# Patient Record
Sex: MALE | Race: WHITE | HISPANIC OR LATINO | URBAN - METROPOLITAN AREA
[De-identification: names, ages, dates, MRNs, and addresses within clinical notes are randomized per-mention and may not be internally consistent; named-entity substitution may affect disease eponyms.]

---

## 2017-08-18 ENCOUNTER — OFFICE VISIT (OUTPATIENT)
Dept: URGENT CARE | Facility: CLINIC | Age: 22
End: 2017-08-18
Payer: COMMERCIAL

## 2017-08-18 VITALS
HEIGHT: 66 IN | WEIGHT: 145 LBS | SYSTOLIC BLOOD PRESSURE: 116 MMHG | DIASTOLIC BLOOD PRESSURE: 76 MMHG | BODY MASS INDEX: 23.3 KG/M2 | TEMPERATURE: 99 F | OXYGEN SATURATION: 97 % | HEART RATE: 70 BPM
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
## 2017-08-18 DIAGNOSIS — S16.1XXA CERVICAL STRAIN, INITIAL ENCOUNTER: ICD-10-CM

## 2017-08-18 PROCEDURE — 99203 OFFICE O/P NEW LOW 30 MIN: CPT | Performed by: PHYSICIAN ASSISTANT

## 2017-08-18 RX ORDER — IBUPROFEN 200 MG
200 TABLET ORAL EVERY 6 HOURS PRN
COMMUNITY

## 2017-08-18 ASSESSMENT — ENCOUNTER SYMPTOMS
HEADACHES: 0
NAUSEA: 0
SYNCOPE: 0
SHORTNESS OF BREATH: 0
COUGH: 0
PHOTOPHOBIA: 0
VISUAL CHANGE: 0
WEAKNESS: 0
ABDOMINAL PAIN: 0
LEG PAIN: 0
PALPITATIONS: 0
TINGLING: 0
NUMBNESS: 0
SORE THROAT: 0
VOMITING: 0
PARESIS: 0
DOUBLE VISION: 0
DIARRHEA: 0
MYALGIAS: 1
FOCAL WEAKNESS: 0
WEIGHT LOSS: 0
BLURRED VISION: 0
SENSORY CHANGE: 0
FEVER: 0
NECK PAIN: 1
TROUBLE SWALLOWING: 0
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
## 2017-11-04 ENCOUNTER — HOSPITAL ENCOUNTER (OUTPATIENT)
Facility: MEDICAL CENTER | Age: 22
End: 2017-11-04
Attending: PHYSICIAN ASSISTANT
Payer: COMMERCIAL

## 2017-11-04 ENCOUNTER — OFFICE VISIT (OUTPATIENT)
Dept: URGENT CARE | Facility: PHYSICIAN GROUP | Age: 22
End: 2017-11-04
Payer: COMMERCIAL

## 2017-11-04 VITALS
HEART RATE: 88 BPM | RESPIRATION RATE: 14 BRPM | WEIGHT: 145 LBS | HEIGHT: 66 IN | BODY MASS INDEX: 23.3 KG/M2 | OXYGEN SATURATION: 98 % | SYSTOLIC BLOOD PRESSURE: 122 MMHG | TEMPERATURE: 99.1 F | DIASTOLIC BLOOD PRESSURE: 88 MMHG

## 2017-11-04 DIAGNOSIS — R10.11 RUQ ABDOMINAL PAIN: ICD-10-CM

## 2017-11-04 PROCEDURE — 99213 OFFICE O/P EST LOW 20 MIN: CPT | Performed by: PHYSICIAN ASSISTANT

## 2017-11-04 NOTE — PROGRESS NOTES
Chief Complaint   Patient presents with   • Abdominal Pain     RUQ pain; has had two episodes after heavy meals in the past month       HISTORY OF PRESENT ILLNESS: Patient is a 22 y.o. male who presents today for 2 episodes in the last month of RUQ pain.   He states the first episode was after having fried chicken and about 1 hour later he had RUQ pain for 4 hours and vomiting.   This resolved on its own.   2 days ago he had [employer] and similar episode and felt nausea but no vomiting.  Mom, dad and sisters had their gallbladders removed.  He states he has specific concern for this.  No other history of this and there have been many times that he has eaten fattier foods and not had the symptoms.    No changes in bowels.  No hx of GERD or acid reflux  symptoms. No changes in bowels.  No changes in weight.  No fevers or chills.  No painful or difficulty swallowing.     There are no active problems to display for this patient.      Allergies:Penicillins    Current Outpatient Prescriptions Ordered in [facility]   Medication Sig Dispense Refill   • ibuprofen (MOTRIN) 200 MG Tab Take 200 mg by mouth every 6 hours as needed.       No current Epic-ordered facility-administered medications on file.        History reviewed. No pertinent past medical history.    Social History   Substance Use Topics   • Smoking status: Never Smoker   • Smokeless tobacco: Former User     Types: Chew     Quit date: 12/1/2013   • Alcohol use Not on file       No family status information on file.   History reviewed. No pertinent family history.    ROS:  Review of Systems   Constitutional: Negative for fever, chills, weight loss and malaise/fatigue.   HENT: Negative for ear pain, nosebleeds, congestion, sore throat and neck pain.    Eyes: Negative for blurred vision.   Respiratory: Negative for cough, sputum production, shortness of breath and wheezing.    Cardiovascular: Negative for chest pain, palpitations, orthopnea and leg swelling.  "  Gastrointestinal: SEE HPI  All other systems reviewed and are negative.       Exam:  Blood pressure 122/88, pulse 88, temperature 37.3 °C (99.1 °F), resp. rate 14, height 1.676 m (5' 6\"), weight 65.8 kg (145 lb), SpO2 98 %.  General:  Well nourished, well developed male in NAD  Eyes: PERRLA, EOM within normal limits, no conjunctival injection, no scleral icterus, visual fields and acuity grossly intact.  Mouth: reasonable hygiene, no erythema exudates or tonsillar enlargement.  Neck: no masses, range of motion within normal limits, no tracheal deviation. No lymphadenopathy  Pulmonary: Normal respiratory effort, no wheezes, crackles, or rhonchi.  Cardiovascular: regular rate and rhythm without murmurs, rubs, or gallops.  Abdomen: Soft, nontender, nondistended. Normal bowel sounds. No hepatosplenomegaly or masses, or hernias. No rebound or guarding.  Skin: No visible rashes or lesion. Warm, pink, dry.   Extremities: no clubbing, cyanosis, or edema.  Neuro: A&O x 3. Speech normal/clear.  Normal gait.     Impression       1.  1 small 2 mm polyp in the gallbladder neck. Otherwise normal appearance of the gallbladder.    2.  No biliary dilatation. Normal appearance of the liver.    3.  Pancreas not visualized.    4.  Otherwise negative right upper quadrant ultrasound.   Reading Provider Reading Date   Zeus Chaudhary M.D. Nov 8, 2017         Assessment/Plan:  1. RUQ abdominal pain  US-GALLBLADDER    CBC WITH DIFFERENTIAL    COMP METABOLIC PANEL    LIPASE    GAMMA GT (GGT)       -u/s results reviewed with patient as grossly unremarkable.   -issue with labs as the blood was drawn and ultimately due to communication between [facility] lab and [facility] lab blood was not ran or accounted for.  Please note this was addressed and escalated to be made known to area practice manager.  Speaking with patient on 11/16/17 after he returned from [facility] to [place] he reports no further episodes.  We offered to repeat lab draw at this time but " he states given his lack of current symptoms or recurrence he will hold off and follow up with PCP instead, letting us know if he changes his mind on the labs.   -RTC and ER precautions reviewed regarding abdominal pain.       Supportive care, differential diagnoses, and indications for immediate follow-up discussed with patient.   Pathogenesis of diagnosis discussed including typical length and natural progression.   Instructed to return to clinic or nearest emergency department for any change in condition, further concerns, or worsening of symptoms.  Patient states understanding of the plan of care and discharge instructions.  Instructed to make an appointment, for follow up, with their primary care provider.      Sarahi Caldera P.A.-C.

## 2017-11-08 ENCOUNTER — HOSPITAL ENCOUNTER (OUTPATIENT)
Dept: RADIOLOGY | Facility: MEDICAL CENTER | Age: 22
End: 2017-11-08
Attending: PHYSICIAN ASSISTANT
Payer: COMMERCIAL

## 2017-11-08 DIAGNOSIS — R10.11 RUQ ABDOMINAL PAIN: ICD-10-CM

## 2017-11-08 PROCEDURE — 76705 ECHO EXAM OF ABDOMEN: CPT

## 2017-11-16 ENCOUNTER — TELEPHONE (OUTPATIENT)
Dept: URGENT CARE | Facility: PHYSICIAN GROUP | Age: 22
End: 2017-11-16

## 2017-11-16 NOTE — TELEPHONE ENCOUNTER
Called Pt. And gave his message. Pt. Stated that he understood the message. Pt. States that he will most likely follow up with his primary care as he is having no further symptoms. Pt. States that he will have us order the lab only if he is unable to follow up with his primary care soon. Pt. Will call me if he does change his mind about getting the labs.

## 2017-11-16 NOTE — TELEPHONE ENCOUNTER
Mattie, please call patient and let him know that they were unable to locate his labs  This has been discussed with our area practice manager who is aware of the situation.   We can have him re-drawn if he is still having symptoms or if feeling better and based on negative U/S he can hold off and do work up with PCP in [facility] as needed  Let me know his preferred plan.   My intent is that he will not be charged for 2 lab draws please make him aware.     Thank you.

## 2018-02-17 ENCOUNTER — OFFICE VISIT (OUTPATIENT)
Dept: URGENT CARE | Facility: CLINIC | Age: 23
End: 2018-02-17
Payer: COMMERCIAL

## 2018-02-17 VITALS
HEIGHT: 66 IN | SYSTOLIC BLOOD PRESSURE: 118 MMHG | TEMPERATURE: 99.7 F | HEART RATE: 72 BPM | DIASTOLIC BLOOD PRESSURE: 70 MMHG | WEIGHT: 142 LBS | OXYGEN SATURATION: 97 % | BODY MASS INDEX: 22.82 KG/M2

## 2018-02-17 DIAGNOSIS — R68.89 FLU-LIKE SYMPTOMS: ICD-10-CM

## 2018-02-17 LAB
FLUAV+FLUBV AG SPEC QL IA: NEGATIVE
HETEROPH AB SER QL LA: NEGATIVE
INT CON NEG: NEGATIVE
INT CON POS: POSITIVE
S PYO AG THROAT QL: NEGATIVE

## 2018-02-17 PROCEDURE — 87880 STREP A ASSAY W/OPTIC: CPT | Performed by: PHYSICIAN ASSISTANT

## 2018-02-17 PROCEDURE — 99214 OFFICE O/P EST MOD 30 MIN: CPT | Performed by: PHYSICIAN ASSISTANT

## 2018-02-17 PROCEDURE — 87804 INFLUENZA ASSAY W/OPTIC: CPT | Performed by: PHYSICIAN ASSISTANT

## 2018-02-17 PROCEDURE — 86308 HETEROPHILE ANTIBODY SCREEN: CPT | Performed by: PHYSICIAN ASSISTANT

## 2018-02-17 PROCEDURE — 99000 SPECIMEN HANDLING OFFICE-LAB: CPT | Performed by: PHYSICIAN ASSISTANT

## 2018-02-17 RX ORDER — DIPHENHYDRAMINE HYDROCHLORIDE AND LIDOCAINE HYDROCHLORIDE AND ALUMINUM HYDROXIDE AND MAGNESIUM HYDRO
5 KIT EVERY 6 HOURS PRN
Qty: 100 ML | Refills: 0 | Status: SHIPPED | OUTPATIENT
Start: 2018-02-17

## 2018-02-17 ASSESSMENT — ENCOUNTER SYMPTOMS
CHILLS: 1
SWOLLEN GLANDS: 1
MYALGIAS: 1
COUGH: 0
TROUBLE SWALLOWING: 1
FEVER: 1
NAUSEA: 0
NECK PAIN: 0
SORE THROAT: 1
DIZZINESS: 0
VOMITING: 0
HEADACHES: 0
SPUTUM PRODUCTION: 0
HOARSE VOICE: 0
ABDOMINAL PAIN: 0
DIARRHEA: 0
SHORTNESS OF BREATH: 0

## 2018-02-17 NOTE — PROGRESS NOTES
"Subjective:      Albert Yeung is a 23 y.o. male who presents with Pharyngitis (x4 days)            Pharyngitis    This is a new problem. The current episode started in the past 7 days (4 days). The problem has been unchanged. Neither side of throat is experiencing more pain than the other. The maximum temperature recorded prior to his arrival was 101 - 101.9 F. The fever has been present for 1 to 2 days. The pain is at a severity of 2/10. The pain is mild. Associated symptoms include swollen glands and trouble swallowing. Pertinent negatives include no abdominal pain, congestion, coughing, diarrhea, ear pain, headaches, hoarse voice, neck pain, shortness of breath or vomiting. He has had no exposure to strep or mono. He has tried nothing for the symptoms.     Patient presents to urgent care reporting a 4 day history of fevers, chills, body aches sore throat, and nausea. No cough, congestion, or SOB. His temperature this morning was 101 F and he took ibuprofen 3 hours PTA. He has no known medical problems and doesn't take any routine medications. He has received a flu shot this year.     Review of Systems   Constitutional: Positive for chills and fever.   HENT: Positive for sore throat and trouble swallowing. Negative for congestion, ear pain and hoarse voice.    Respiratory: Negative for cough, sputum production and shortness of breath.    Cardiovascular: Negative for chest pain.   Gastrointestinal: Negative for abdominal pain, diarrhea, nausea and vomiting.   Genitourinary: Negative.    Musculoskeletal: Positive for myalgias. Negative for neck pain.   Skin: Negative for rash.   Neurological: Negative for dizziness and headaches.        Objective:     /70   Pulse 72   Temp 37.6 °C (99.7 °F)   Ht 1.676 m (5' 5.98\")   Wt 64.4 kg (142 lb)   SpO2 97%   BMI 22.93 kg/m²      Physical Exam   Constitutional: He is oriented to person, place, and time. He appears well-developed and well-nourished. No distress. "   HENT:   Head: Normocephalic and atraumatic.   Right Ear: Hearing, tympanic membrane, external ear and ear canal normal.   Left Ear: Hearing, tympanic membrane, external ear and ear canal normal.   Mouth/Throat: Posterior oropharyngeal erythema present. No oropharyngeal exudate or posterior oropharyngeal edema.   Posterior oropharyngeal erythema without enlarged tonsils. Mild amount of exudate noted bilaterally.    Eyes: Conjunctivae are normal. Pupils are equal, round, and reactive to light. Right eye exhibits no discharge. Left eye exhibits no discharge.   Neck: Normal range of motion.   Cardiovascular: Normal rate, regular rhythm and normal heart sounds.    No murmur heard.  Pulmonary/Chest: Effort normal and breath sounds normal. No respiratory distress. He has no wheezes. He has no rales.   Musculoskeletal: Normal range of motion.   Lymphadenopathy:     He has cervical adenopathy.   Neurological: He is alert and oriented to person, place, and time.   Skin: Skin is warm and dry. He is not diaphoretic.   Psychiatric: He has a normal mood and affect. His behavior is normal.   Nursing note and vitals reviewed.         PMH:  has no past medical history on file.  MEDS:   Current Outpatient Prescriptions:   •  DPH-Lido-AlHydr-MgHydr-Simeth (MAGIC MOUTHWASH BLM) Suspension, Take 5 mL by mouth every 6 hours as needed., Disp: 100 mL, Rfl: 0  •  ibuprofen (MOTRIN) 200 MG Tab, Take 200 mg by mouth every 6 hours as needed., Disp: , Rfl:   ALLERGIES:   Allergies   Allergen Reactions   • Penicillins Anaphylaxis and Shortness of Breath     SURGHX: History reviewed. No pertinent surgical history.  SOCHX:  reports that he has never smoked. He quit smokeless tobacco use about 4 years ago. His smokeless tobacco use included Chew.  FH: family history is not on file.       Assessment/Plan:     1. Flu-like symptoms  - POCT Rapid Strep A: NEGATIVE  - POCT Influenza A/B: NEGATIVE  - POCT Mononucleosis (mono): NEGATIVE  - CULTURE  THROAT; Future  - DPH-Lido-AlHydr-MgHydr-Simeth (MAGIC MOUTHWASH BLM) Suspension; Take 5 mL by mouth every 6 hours as needed.  Dispense: 100 mL; Refill: 0    POC flu negative at today's visit, although symptoms consistent with influenza. Will send throat culture given small amount of exudate noted on exam at today's visit. Recommend supportive care. Increased fluids and rest. Magic mouthwash as needed for symptomatic relief. Call or return to office if symptoms persist or worsen. The patient demonstrated a good understanding and agreed with the treatment plan.

## 2018-02-18 ENCOUNTER — HOSPITAL ENCOUNTER (OUTPATIENT)
Facility: MEDICAL CENTER | Age: 23
End: 2018-02-18
Attending: PHYSICIAN ASSISTANT
Payer: COMMERCIAL

## 2018-02-25 ENCOUNTER — OFFICE VISIT (OUTPATIENT)
Dept: URGENT CARE | Facility: CLINIC | Age: 23
End: 2018-02-25
Payer: COMMERCIAL

## 2018-02-25 VITALS
OXYGEN SATURATION: 98 % | HEIGHT: 67 IN | RESPIRATION RATE: 18 BRPM | SYSTOLIC BLOOD PRESSURE: 110 MMHG | BODY MASS INDEX: 22.13 KG/M2 | DIASTOLIC BLOOD PRESSURE: 64 MMHG | WEIGHT: 141 LBS | HEART RATE: 100 BPM | TEMPERATURE: 98 F

## 2018-02-25 DIAGNOSIS — L08.9 FINGER INFECTION: ICD-10-CM

## 2018-02-25 DIAGNOSIS — J40 BRONCHITIS: ICD-10-CM

## 2018-02-25 PROCEDURE — 99214 OFFICE O/P EST MOD 30 MIN: CPT | Performed by: PHYSICIAN ASSISTANT

## 2018-02-25 RX ORDER — ALBUTEROL SULFATE 90 UG/1
2 AEROSOL, METERED RESPIRATORY (INHALATION) EVERY 4 HOURS PRN
Qty: 1 INHALER | Refills: 0 | Status: SHIPPED | OUTPATIENT
Start: 2018-02-25

## 2018-02-25 RX ORDER — AZITHROMYCIN 500 MG/1
500 TABLET, FILM COATED ORAL DAILY
Qty: 10 TAB | Refills: 0 | Status: SHIPPED | OUTPATIENT
Start: 2018-02-25 | End: 2018-03-07

## 2018-02-25 ASSESSMENT — ENCOUNTER SYMPTOMS
WHEEZING: 0
EYES NEGATIVE: 1
CONSTITUTIONAL NEGATIVE: 1
COUGH: 1
SPUTUM PRODUCTION: 0
FEVER: 0
CARDIOVASCULAR NEGATIVE: 1
SORE THROAT: 0
NEUROLOGICAL NEGATIVE: 1
SHORTNESS OF BREATH: 1

## 2018-02-25 NOTE — PROGRESS NOTES
"Subjective:      Albert Yeung is a 23 y.o. male who presents with Cough (x1week, cough, chest tightness, SOB) and Nail Problem (x1week, right thumb swollen, redness, painful)            Cough   This is a new (cough, some chest tightness, sob; fingernail infection) problem. The current episode started in the past 7 days. The problem has been unchanged. The problem occurs constantly. The cough is non-productive. Associated symptoms include shortness of breath. Pertinent negatives include no fever, sore throat or wheezing. Nothing aggravates the symptoms. He has tried nothing for the symptoms. The treatment provided no relief. There is no history of asthma.   Nail Problem   Associated symptoms include coughing. Pertinent negatives include no fever or sore throat.       Review of Systems   Constitutional: Negative.  Negative for fever.   HENT: Negative.  Negative for sore throat.    Eyes: Negative.    Respiratory: Positive for cough and shortness of breath. Negative for sputum production and wheezing.    Cardiovascular: Negative.    Musculoskeletal: Negative for joint pain.   Skin: Negative.    Neurological: Negative.           Objective:     /64   Pulse 100   Temp 36.7 °C (98 °F)   Resp 18   Ht 1.702 m (5' 7\")   Wt 64 kg (141 lb)   SpO2 98%   BMI 22.08 kg/m²      Physical Exam   Constitutional: He is oriented to person, place, and time. He appears well-developed and well-nourished. No distress.   HENT:   Head: Normocephalic and atraumatic.   Mouth/Throat: Oropharynx is clear and moist.   Eyes: EOM are normal. Pupils are equal, round, and reactive to light.   Neck: Normal range of motion. Neck supple.   Cardiovascular: Normal rate.    Pulmonary/Chest: Effort normal and breath sounds normal. No respiratory distress. He has no wheezes. He has no rales.   Musculoskeletal: He exhibits edema (+paronychia) and tenderness.   Neurological: He is alert and oriented to person, place, and time. He exhibits normal " muscle tone. Coordination normal.   Skin: Skin is warm and dry. There is erythema.   Psychiatric: He has a normal mood and affect. His behavior is normal.   Nursing note and vitals reviewed.    Active Ambulatory Problems     Diagnosis Date Noted   • No Active Ambulatory Problems     Resolved Ambulatory Problems     Diagnosis Date Noted   • No Resolved Ambulatory Problems     No Additional Past Medical History     Current Outpatient Prescriptions on File Prior to Visit   Medication Sig Dispense Refill   • DPH-Lido-AlHydr-MgHydr-Simeth (MAGIC MOUTHWASH BLM) Suspension Take 5 mL by mouth every 6 hours as needed. 100 mL 0   • ibuprofen (MOTRIN) 200 MG Tab Take 200 mg by mouth every 6 hours as needed.       No current facility-administered medications on file prior to visit.      Social History     Social History   • Marital status: Single     Spouse name: N/A   • Number of children: N/A   • Years of education: N/A     Occupational History   • Not on file.     Social History Main Topics   • Smoking status: Never Smoker   • Smokeless tobacco: Former User     Types: Chew     Quit date: 12/1/2013   • Alcohol use Not on file   • Drug use: Unknown   • Sexual activity: Not on file     Other Topics Concern   • Not on file     Social History Narrative   • No narrative on file     History reviewed. No pertinent family history.  Penicillins              Assessment/Plan:     ·  bronchitis  · Paronychia      · rx meds, otc prn

## 2018-02-25 NOTE — LETTER
February 25, 2018         Patient: Albert Yeung   YOB: 1995   Date of Visit: 2/25/2018           To Whom it May Concern:    Albert Yeung was seen in my clinic on 2/25/2018 for illness; please excuse for Monday.    If you have any questions or concerns, please don't hesitate to call.        Sincerely,           Singh Leach P.A.-C.  Electronically Signed

## 2018-06-25 ENCOUNTER — EH NON-PROVIDER (OUTPATIENT)
Dept: OCCUPATIONAL MEDICINE | Facility: CLINIC | Age: 23
End: 2018-06-25

## 2018-06-25 ENCOUNTER — NON-PROVIDER VISIT (OUTPATIENT)
Dept: OCCUPATIONAL MEDICINE | Facility: CLINIC | Age: 23
End: 2018-06-25

## 2018-06-25 DIAGNOSIS — Z01.89 RESPIRATORY CLEARANCE EXAMINATION, ENCOUNTER FOR: ICD-10-CM

## 2018-06-25 PROCEDURE — 8916 PR CLEARANCE ONLY: Performed by: PREVENTIVE MEDICINE

## 2018-06-26 ENCOUNTER — HOSPITAL ENCOUNTER (OUTPATIENT)
Dept: LAB | Facility: MEDICAL CENTER | Age: 23
End: 2018-06-26
Attending: PHYSICIAN ASSISTANT
Payer: COMMERCIAL

## 2018-06-26 ENCOUNTER — OFFICE VISIT (OUTPATIENT)
Dept: URGENT CARE | Facility: PHYSICIAN GROUP | Age: 23
End: 2018-06-26
Payer: COMMERCIAL

## 2018-06-26 VITALS
HEART RATE: 68 BPM | BODY MASS INDEX: 22.13 KG/M2 | SYSTOLIC BLOOD PRESSURE: 118 MMHG | DIASTOLIC BLOOD PRESSURE: 64 MMHG | OXYGEN SATURATION: 98 % | TEMPERATURE: 97.9 F | WEIGHT: 141 LBS | HEIGHT: 67 IN

## 2018-06-26 DIAGNOSIS — R10.13 EPIGASTRIC PAIN: ICD-10-CM

## 2018-06-26 PROCEDURE — 99214 OFFICE O/P EST MOD 30 MIN: CPT | Performed by: PHYSICIAN ASSISTANT

## 2018-06-26 RX ORDER — NICOTINE POLACRILEX 4 MG/1
GUM, CHEWING ORAL
COMMUNITY

## 2018-06-26 RX ORDER — DICLOFENAC SODIUM 25 MG/1
25 TABLET, DELAYED RELEASE ORAL 2 TIMES DAILY
COMMUNITY

## 2018-06-26 ASSESSMENT — ENCOUNTER SYMPTOMS
CONSTIPATION: 0
SORE THROAT: 0
DIZZINESS: 0
HEARTBURN: 0
FEVER: 0
BLOOD IN STOOL: 0
EYE DISCHARGE: 0
NAUSEA: 0
HEMATOCHEZIA: 0
TINGLING: 0
ARTHRALGIAS: 0
VOMITING: 0
CHILLS: 1
BELCHING: 1
MYALGIAS: 0
NECK PAIN: 0
ABDOMINAL PAIN: 1
EYE REDNESS: 0
COUGH: 0
HEADACHES: 0
FLATUS: 1

## 2018-06-26 ASSESSMENT — PAIN SCALES - GENERAL: PAINLEVEL: 2=MINIMAL-SLIGHT

## 2018-06-26 NOTE — PROGRESS NOTES
Subjective:      Albert Yeung is a 23 y.o. male who presents with Abdominal Pain (mid abdominal x5days )            Patient is 23-year-old male who presents with periumbilical and epigastric abdominal cramping for the last 5 days. Patient reports that the pain has improved and currently now is 2 out of 10 in however a few days ago the pain was 10 out of 10. Patient was also evaluated by his primary care provider yesterday with diagnosis suspected gastritis and was encouraged to continue on omeprazole, also encouraged though however within the next 24-48 hours if not improving he could recheck. Patient also had a normal urinalysis yesterday.    Patient does report increased alcohol consumption over the last 2 weeks he should recently took a trip to [place]. Last alcoholic beverage was 5 days ago however this was one beer. Patient denies prior history of pancreatitis. Patient also denies history of NSAID use. Patient denies prior history of pancreatitis, ulcers, gastritis.      Abdominal Pain   This is a new problem. The current episode started in the past 7 days. The onset quality is sudden. The problem occurs constantly. The problem has been waxing and waning. The pain is located in the epigastric region and periumbilical region. The pain is at a severity of 2/10. The pain is mild. The quality of the pain is cramping. The abdominal pain does not radiate. Associated symptoms include belching and flatus. Pertinent negatives include no arthralgias, constipation, dysuria, fever, headaches, hematochezia, hematuria, melena, myalgias, nausea or vomiting. Associated symptoms comments: Patient reports loose stools. Nothing aggravates the pain. The pain is relieved by belching and bowel movements (belching and bowel movements provided minimal improvement). Treatments tried: Pepto-Bismol, omeprazole. The treatment provided mild relief.       Review of Systems   Constitutional: Positive for chills and malaise/fatigue. Negative  "for fever.   HENT: Negative for congestion and sore throat.    Eyes: Negative for discharge and redness.   Respiratory: Negative for cough.    Gastrointestinal: Positive for abdominal pain and flatus. Negative for blood in stool, constipation, heartburn, hematochezia, melena, nausea and vomiting.   Genitourinary: Negative for dysuria, hematuria and urgency.   Musculoskeletal: Negative for arthralgias, joint pain, myalgias and neck pain.   Skin: Negative for itching and rash.   Neurological: Negative for dizziness, tingling and headaches.   All other systems reviewed and are negative.         Objective:     /64   Pulse 68   Temp 36.6 °C (97.9 °F)   Ht 1.702 m (5' 7\")   Wt 64 kg (141 lb)   SpO2 98%   BMI 22.08 kg/m²    PMH:  has no past medical history on file.  MEDS:   Current Outpatient Prescriptions:   •  omeprazole (PRILOSEC) 20 MG Tablet Delayed Response delayed-release tablet, Take  by mouth., Disp: , Rfl:   •  diclofenac EC (VOLTAREN) 25 MG Tablet Delayed Response, Take 25 mg by mouth 2 times a day., Disp: , Rfl:   •  bismuth subsalicylate (PEPTO-BISMOL) 262 MG/15ML Suspension, Take 30 mL by mouth every 6 hours as needed., Disp: , Rfl:   •  albuterol 108 (90 Base) MCG/ACT Aero Soln inhalation aerosol, Inhale 2 Puffs by mouth every four hours as needed for Shortness of Breath., Disp: 1 Inhaler, Rfl: 0  •  Pseudoeph-Doxylamine-DM-APAP (NYQUIL PO), Take  by mouth., Disp: , Rfl:   •  Pseudoephedrine-APAP-DM (DAYQUIL PO), Take  by mouth., Disp: , Rfl:   •  DPH-Lido-AlHydr-MgHydr-Simeth (MAGIC MOUTHWASH BLM) Suspension, Take 5 mL by mouth every 6 hours as needed., Disp: 100 mL, Rfl: 0  •  ibuprofen (MOTRIN) 200 MG Tab, Take 200 mg by mouth every 6 hours as needed., Disp: , Rfl:     Current Facility-Administered Medications:   •  hyoscyamine-maalox plus-lidocaine viscous (GI COCKTAIL) oral susp 30 mL, 30 mL, Oral, Once, Rolo Simmons P.A.-C.  ALLERGIES:   Allergies   Allergen Reactions   • Penicillins " Anaphylaxis and Shortness of Breath     SURGHX: No past surgical history on file.  SOCHX:  reports that he has never smoked. He quit smokeless tobacco use about 4 years ago. His smokeless tobacco use included Chew. He reports that he drinks alcohol. He reports that he does not use drugs.  FH: Family history was reviewed, no pertinent findings to report    Physical Exam   Constitutional: He is oriented to person, place, and time. He appears well-developed and well-nourished.   HENT:   Head: Normocephalic and atraumatic.   Right Ear: External ear normal.   Left Ear: External ear normal.   Nose: Nose normal.   Mouth/Throat: Oropharynx is clear and moist. No oropharyngeal exudate.   Eyes: EOM are normal. Pupils are equal, round, and reactive to light.   Neck: Normal range of motion. Neck supple.   Cardiovascular: Normal rate.    No murmur heard.  Pulmonary/Chest: Effort normal and breath sounds normal. No respiratory distress.   Abdominal: Soft. He exhibits no mass. There is tenderness in the epigastric area and periumbilical area. There is no rigidity, no rebound and no guarding. No hernia.       Minimal mid-epigastric tenderness- without rigidity.   Hyperactive bowel sounds. Neg. Heel tap.    Lymphadenopathy:     He has no cervical adenopathy.   Neurological: He is alert and oriented to person, place, and time.   Skin: Skin is warm. No rash noted. No pallor.   Good skin turgor.      Psychiatric: He has a normal mood and affect. His behavior is normal.   Vitals reviewed.              Assessment/Plan:     1. Epigastric pain  - CBC WITH DIFFERENTIAL; Future  - COMP METABOLIC PANEL; Future  - LIPASE; Future    High suspicion for gastritis at this time, pt has had mild improvement with omeprazole on the recent past. Discussed my concern regarding recent increased alcohol consumption which I will check a lipase as well today. Also will check LFTs. Symptoms are not consistent with biliary colic however will pursue imaging  and further labs are abnormal. Discussed possibility of ultrasound today with the patient-decided against such and would like to wait for labs at this time. I do think this is reasonable. I will alert this  the patient as labs return.   Patient given precautionary s/sx that mandate immediate follow up and evaluation in the ED. Advised of risks of not doing so.    DDX, Supportive care, and indications for immediate follow-up discussed with patient.    Instructed to return to clinic or nearest emergency department if we are not available for any change in condition, further concerns, or worsening of symptoms.    The patient demonstrated a good understanding and agreed with the treatment plan.  Please note that this dictation was created using voice recognition software. I have made every reasonable attempt to correct obvious errors, but I expect that there are errors of grammar and possibly content that I did not discover before finalizing the note.

## 2020-12-22 DIAGNOSIS — Z23 NEED FOR VACCINATION: ICD-10-CM
